# Patient Record
Sex: MALE | Race: ASIAN | NOT HISPANIC OR LATINO | Employment: FULL TIME | URBAN - METROPOLITAN AREA
[De-identification: names, ages, dates, MRNs, and addresses within clinical notes are randomized per-mention and may not be internally consistent; named-entity substitution may affect disease eponyms.]

---

## 2021-12-06 ENCOUNTER — OFFICE VISIT (OUTPATIENT)
Dept: FAMILY MEDICINE | Facility: CLINIC | Age: 26
End: 2021-12-06
Payer: COMMERCIAL

## 2021-12-06 VITALS
HEART RATE: 77 BPM | WEIGHT: 119 LBS | SYSTOLIC BLOOD PRESSURE: 119 MMHG | DIASTOLIC BLOOD PRESSURE: 77 MMHG | OXYGEN SATURATION: 97 % | TEMPERATURE: 98 F | HEIGHT: 66 IN | BODY MASS INDEX: 19.13 KG/M2 | RESPIRATION RATE: 18 BRPM

## 2021-12-06 DIAGNOSIS — Z71.9 ENCOUNTER FOR COUNSELING: ICD-10-CM

## 2021-12-06 DIAGNOSIS — Z13.220 LIPID SCREENING: Primary | ICD-10-CM

## 2021-12-06 DIAGNOSIS — Z13.1 SCREENING FOR DIABETES MELLITUS: ICD-10-CM

## 2021-12-06 PROCEDURE — 0004A COVID-19,PF,PFIZER (12+ YRS): CPT | Performed by: INTERNAL MEDICINE

## 2021-12-06 PROCEDURE — 99203 OFFICE O/P NEW LOW 30 MIN: CPT | Mod: 25 | Performed by: INTERNAL MEDICINE

## 2021-12-06 PROCEDURE — 91300 COVID-19,PF,PFIZER (12+ YRS): CPT | Performed by: INTERNAL MEDICINE

## 2021-12-06 ASSESSMENT — MIFFLIN-ST. JEOR: SCORE: 1462.53

## 2021-12-06 NOTE — PROGRESS NOTES
"    ICD-10-CM    1. Lipid screening  Z13.220 Lipid panel reflex to direct LDL Fasting   2. Screening for diabetes mellitus  Z13.1 Glucose   3. Encounter for counseling  Z71.9      This is extremely nice 26 years old  Patient is here to establish care and we talked about lipid screening and diabetes screening  We will obtain his records from previous immunizations  I also discussed with him about the cancer screenings and vascular screenings at an older age because he is adopted  He has excellent health care hygiene  And it is a pleasure to see    Subjective   Lenin is a 26 year old who presents for the following health issues     HPI     ESTABLISH CARE  Patient is new to me and has moved to [place] from [place]  He is now living independently and is done with grad school in [place]  He has no health issues and is adopted and does not know about his biological parents    History reviewed. No pertinent past medical history.  Family History   Problem Relation Age of Onset     Unknown/Adopted Mother      Unknown/Adopted Father          Review of Systems   10 point ROS of systems including Constitutional, Eyes, Respiratory, Cardiovascular, Gastroenterology, Genitourinary, Integumentary, Muscularskeletal, Psychiatric were all negative except for pertinent positives noted in my HPI.        Objective    /77 (BP Location: Right arm, Patient Position: Chair, Cuff Size: Adult Regular)   Pulse 77   Temp 98  F (36.7  C) (Temporal)   Resp 18   Ht 1.676 m (5' 6\")   Wt 54 kg (119 lb)   SpO2 97%   BMI 19.21 kg/m    Body mass index is 19.21 kg/m .  Physical Exam   GENERAL: healthy, alert and no distress  NECK: no adenopathy, no asymmetry, masses, or scars and thyroid normal to palpation  RESP: lungs clear to auscultation - no rales, rhonchi or wheezes  CV: regular rate and rhythm, normal S1 S2, no S3 or S4, no murmur, click or rub, no peripheral edema and peripheral pulses strong  NEURO: Normal strength and tone, " mentation intact and speech normal  PSYCH: mentation appears normal, affect normal/bright    There is no problem list on file for this patient.

## 2022-02-26 ENCOUNTER — HEALTH MAINTENANCE LETTER (OUTPATIENT)
Age: 27
End: 2022-02-26

## 2022-10-30 ENCOUNTER — HEALTH MAINTENANCE LETTER (OUTPATIENT)
Age: 27
End: 2022-10-30

## 2023-03-23 ENCOUNTER — OFFICE VISIT (OUTPATIENT)
Dept: FAMILY MEDICINE | Facility: CLINIC | Age: 28
End: 2023-03-23
Payer: COMMERCIAL

## 2023-03-23 VITALS
HEART RATE: 73 BPM | WEIGHT: 122 LBS | SYSTOLIC BLOOD PRESSURE: 122 MMHG | RESPIRATION RATE: 16 BRPM | DIASTOLIC BLOOD PRESSURE: 81 MMHG | OXYGEN SATURATION: 96 % | TEMPERATURE: 98 F | HEIGHT: 66 IN | BODY MASS INDEX: 19.61 KG/M2

## 2023-03-23 DIAGNOSIS — L30.9 DERMATITIS: ICD-10-CM

## 2023-03-23 DIAGNOSIS — Z00.00 ROUTINE HISTORY AND PHYSICAL EXAMINATION OF ADULT: Primary | ICD-10-CM

## 2023-03-23 DIAGNOSIS — R21 RASH: ICD-10-CM

## 2023-03-23 PROCEDURE — 99395 PREV VISIT EST AGE 18-39: CPT | Performed by: INTERNAL MEDICINE

## 2023-03-23 RX ORDER — KETOCONAZOLE 20 MG/G
CREAM TOPICAL
COMMUNITY
Start: 2022-10-04

## 2023-03-23 RX ORDER — TRIAMCINOLONE ACETONIDE 1 MG/G
CREAM TOPICAL 2 TIMES DAILY
Qty: 15 G | Refills: 3 | Status: SHIPPED | OUTPATIENT
Start: 2023-03-23 | End: 2023-12-22

## 2023-03-23 RX ORDER — BENZOCAINE/MENTHOL 6 MG-10 MG
LOZENGE MUCOUS MEMBRANE 2 TIMES DAILY
COMMUNITY
Start: 2022-10-04

## 2023-03-23 ASSESSMENT — ENCOUNTER SYMPTOMS
NAUSEA: 0
HEMATOCHEZIA: 0
FEVER: 0
EYE PAIN: 0
PARESTHESIAS: 0
DIARRHEA: 0
WEAKNESS: 0
HEARTBURN: 0
PALPITATIONS: 0
SORE THROAT: 0
HEMATURIA: 0
NERVOUS/ANXIOUS: 0
MYALGIAS: 0
ARTHRALGIAS: 0
DIZZINESS: 0
ABDOMINAL PAIN: 0
SHORTNESS OF BREATH: 0
JOINT SWELLING: 0
FREQUENCY: 0
HEADACHES: 0
CONSTIPATION: 0
CHILLS: 0
COUGH: 0
DYSURIA: 0

## 2023-03-23 ASSESSMENT — PAIN SCALES - GENERAL: PAINLEVEL: NO PAIN (0)

## 2023-03-23 NOTE — PROGRESS NOTES
SUBJECTIVE:   CC: García is an 27 year old who presents for preventative health visit.   Patient has been advised of split billing requirements and indicates understanding: Yes  HPI        Today's PHQ-2 Score:   PHQ-2 ( 1999 [employer]) 12/6/2021   Q1: Little interest or pleasure in doing things 0   Q2: Feeling down, depressed or hopeless 0   PHQ-2 Score 0   Q1: Little interest or pleasure in doing things Not at all   Q2: Feeling down, depressed or hopeless Not at all   PHQ-2 Score 0       Have you ever done Advance Care Planning? (For example, a Health Directive, POLST, or a discussion with a medical provider or your loved ones about your wishes): No, advance care planning information given to patient to review.  Advanced care planning was discussed at today's visit.    Social History     Tobacco Use     Smoking status: Never     Smokeless tobacco: Never   Substance Use Topics     Alcohol use: Not on file     Comment: Occ         Last PSA: No results found for: PSA    Reviewed orders with patient. Reviewed health maintenance and updated orders accordingly - Yes  Labs reviewed in EPIC    Reviewed and updated as needed this visit by clinical staff                  Reviewed and updated as needed this visit by Provider                 No past medical history on file.     Review of Systems  CONSTITUTIONAL: NEGATIVE for fever, chills, change in weight  INTEGUMENTARY/SKIN: NEGATIVE for worrisome rashes, moles or lesions  EYES: NEGATIVE for vision changes or irritation  ENT: NEGATIVE for ear, mouth and throat problems  RESP: NEGATIVE for significant cough or SOB  CV: NEGATIVE for chest pain, palpitations or peripheral edema  GI: NEGATIVE for nausea, abdominal pain, heartburn, or change in bowel habits   male: negative for dysuria, hematuria, decreased urinary stream, erectile dysfunction, urethral discharge  MUSCULOSKELETAL: NEGATIVE for significant arthralgias or myalgia  NEURO: NEGATIVE for weakness, dizziness or  "paresthesias  PSYCHIATRIC: NEGATIVE for changes in mood or affect    OBJECTIVE:   /81 (BP Location: Right arm, Patient Position: Sitting, Cuff Size: Adult Regular)   Pulse 73   Temp 98  F (36.7  C) (Oral)   Resp 16   Ht 1.676 m (5' 6\")   Wt 55.3 kg (122 lb)   SpO2 96%   BMI 19.69 kg/m      Physical Exam  GENERAL: alert and no distress  EYES: Eyes grossly normal to inspection, conjunctivae and sclerae normal  HENT: ear canals and TM's normal, nose and mouth without ulcers or lesions  NECK: no asymmetry  RESP: lungs clear to auscultation  CV: regular rate and rhythm  ABDOMEN: soft, nontender, no hepatosplenomegaly, no masses and bowel sounds normal  MS: no gross musculoskeletal   SKIN: multiple rashes  NEURO: Normal strength and tone, mentation intact and speech normal  PSYCH: mentation appears normal, affect normal/bright    Diagnostic Test Results:  Labs reviewed in Epic    ASSESSMENT/PLAN:   Lenin was seen today for physical and derm problem.    Diagnoses and all orders for this visit:    Routine history and physical examination of adult    Rash  -     REVIEW OF HEALTH MAINTENANCE PROTOCOL ORDERS  -     triamcinolone (KENALOG) 0.1 % external cream; Apply topically 2 times daily  -     Adult Dermatology Referral; Future    Dermatitis  -     REVIEW OF HEALTH MAINTENANCE PROTOCOL ORDERS  -     triamcinolone (KENALOG) 0.1 % external cream; Apply topically 2 times daily  -     Adult Dermatology Referral; Future        Patient has been advised of split billing requirements and indicates understanding: Yes      COUNSELING:   Reviewed preventive health counseling, as reflected in patient instructions  Special attention given to:        Regular exercise       Healthy diet/nutrition        He reports that he has never smoked. He has never used smokeless tobacco.        Sumemr Azevedo MD  [facility]  "

## 2023-12-22 ENCOUNTER — ANCILLARY PROCEDURE (OUTPATIENT)
Dept: GENERAL RADIOLOGY | Facility: CLINIC | Age: 28
End: 2023-12-22
Attending: INTERNAL MEDICINE
Payer: COMMERCIAL

## 2023-12-22 ENCOUNTER — OFFICE VISIT (OUTPATIENT)
Dept: FAMILY MEDICINE | Facility: CLINIC | Age: 28
End: 2023-12-22
Payer: COMMERCIAL

## 2023-12-22 VITALS
RESPIRATION RATE: 16 BRPM | DIASTOLIC BLOOD PRESSURE: 78 MMHG | OXYGEN SATURATION: 94 % | BODY MASS INDEX: 19.44 KG/M2 | SYSTOLIC BLOOD PRESSURE: 110 MMHG | WEIGHT: 121 LBS | HEIGHT: 66 IN | TEMPERATURE: 98.8 F | HEART RATE: 79 BPM

## 2023-12-22 DIAGNOSIS — M25.562 CHRONIC PAIN OF BOTH KNEES: ICD-10-CM

## 2023-12-22 DIAGNOSIS — M25.561 CHRONIC PAIN OF BOTH KNEES: Primary | ICD-10-CM

## 2023-12-22 DIAGNOSIS — G89.29 CHRONIC PAIN OF BOTH KNEES: Primary | ICD-10-CM

## 2023-12-22 DIAGNOSIS — M25.562 CHRONIC PAIN OF BOTH KNEES: Primary | ICD-10-CM

## 2023-12-22 DIAGNOSIS — G89.29 CHRONIC PAIN OF BOTH KNEES: ICD-10-CM

## 2023-12-22 DIAGNOSIS — M25.561 CHRONIC PAIN OF BOTH KNEES: ICD-10-CM

## 2023-12-22 PROCEDURE — 73562 X-RAY EXAM OF KNEE 3: CPT | Mod: TC | Performed by: RADIOLOGY

## 2023-12-22 PROCEDURE — 99213 OFFICE O/P EST LOW 20 MIN: CPT | Performed by: INTERNAL MEDICINE

## 2023-12-22 RX ORDER — TRIAMCINOLONE ACETONIDE 1 MG/G
OINTMENT TOPICAL 2 TIMES DAILY
COMMUNITY
Start: 2023-10-25

## 2023-12-22 ASSESSMENT — PAIN SCALES - GENERAL: PAINLEVEL: MILD PAIN (3)

## 2023-12-22 NOTE — PROGRESS NOTES
Barb Mariano is a 28 year old, presenting for the following health issues:  Musculoskeletal Problem (Knee issue)      History of Present Illness       Reason for visit:  Knee    He eats 2-3 servings of fruits and vegetables daily.He consumes 1 sweetened beverage(s) daily.He exercises with enough effort to increase his heart rate 9 or less minutes per day.  He exercises with enough effort to increase his heart rate 3 or less days per week.   He is taking medications regularly.         Current Medications:     Current Outpatient Medications   Medication Sig Dispense Refill    hydrocortisone (CORTAID) 1 % external cream 2 times daily to affected area      ketoconazole (NIZORAL) 2 % external cream APPLY ONCE DAILY TO AFFECTED AREA X2 WEEKS.      triamcinolone (KENALOG) 0.1 % external ointment Apply topically 2 times daily           Allergies:      Allergies   Allergen Reactions    Cefprozil             Past Medical History:     Past Medical History:   Diagnosis Date    Rash          Past Surgical History:   No past surgical history on file.      Family Medical History:     Family History   Adopted: Yes   Problem Relation Age of Onset    Unknown/Adopted Mother     Unknown/Adopted Father          Social History:     Social History     Socioeconomic History    Marital status: Single     Spouse name: Not on file    Number of children: Not on file    Years of education: Not on file    Highest education level: Not on file   Occupational History    Not on file   Tobacco Use    Smoking status: Never    Smokeless tobacco: Never   Substance and Sexual Activity    Alcohol use: Not on file     Comment: Occ    Drug use: Never    Sexual activity: Yes     Partners: Female     Birth control/protection: Condom   Other Topics Concern    Not on file   Social History Narrative    Not on file     Social Determinants of Health     Financial Resource Strain: Low Risk  (12/22/2023)    Financial Resource Strain     Within the past 12  months, have you or your family members you live with been unable to get utilities (heat, electricity) when it was really needed?: No   Food Insecurity: Low Risk  (12/22/2023)    Food Insecurity     Within the past 12 months, did you worry that your food would run out before you got money to buy more?: No     Within the past 12 months, did the food you bought just not last and you didn t have money to get more?: No   Transportation Needs: Low Risk  (12/22/2023)    Transportation Needs     Within the past 12 months, has lack of transportation kept you from medical appointments, getting your medicines, non-medical meetings or appointments, work, or from getting things that you need?: No   Physical Activity: Not on file   Stress: Not on file   Social Connections: Not on file   Interpersonal Safety: Low Risk  (12/22/2023)    Interpersonal Safety     Do you feel physically and emotionally safe where you currently live?: Yes     Within the past 12 months, have you been hit, slapped, kicked or otherwise physically hurt by someone?: No     Within the past 12 months, have you been humiliated or emotionally abused in other ways by your partner or ex-partner?: No   Housing Stability: Low Risk  (12/22/2023)    Housing Stability     Do you have housing? : Yes     Are you worried about losing your housing?: No           Review of System:     Constitutional: Negative for fever or chills  Skin: Negative for rashes  Ears/Nose/Throat: Negative for nasal congestion, sore throat  Respiratory: No shortness of breath, dyspnea on exertion, cough, or hemoptysis  Cardiovascular: Negative for chest pain  Gastrointestinal: Negative for nausea, vomiting  Genitourinary: Negative for dysuria, hematuria  Musculoskeletal: positive for mechanical k  Neurologic: Negative for headaches  Psychiatric: Negative for depression, anxiety  Hematologic/Lymphatic/Immunologic: Negative  Endocrine: Negative  Behavioral: Negative for tobacco use       Physical  "Exam:   /78 (BP Location: Right arm, Patient Position: Sitting, Cuff Size: Adult Regular)   Pulse 79   Temp 98.8  F (37.1  C) (Oral)   Resp 16   Ht 1.683 m (5' 6.25\")   Wt 54.9 kg (121 lb)   SpO2 94%   BMI 19.38 kg/m      GENERAL: alert and no distress  EYES: eyes grossly normal to inspection, and conjunctivae and sclerae normal  HENT: Normocephalic atraumatic. Nose and mouth without ulcers or lesions  NECK: supple  RESP: lungs clear to auscultation   CV: regular rate and rhythm, normal S1 S2  LYMPH: no peripheral edema   ABDOMEN: nondistended  MS: bilateral anterior knee pain symptoms noted  SKIN: no suspicious lesions or rashes  NEURO: Alert & Oriented x 3.   PSYCH: mentation appears normal, affect normal        Diagnostic Test Results:     Diagnostic Test Results:  Labs reviewed in Epic  Results for orders placed or performed in visit on 12/22/23   XR Knee Bilateral 3 Views     Status: None    Narrative    KNEE BILATERAL 3 VIEWS  DATE/TIME: 12/22/2023 11:49 AM     INDICATION: Bilateral knee pain.   COMPARISON: None.      Impression    IMPRESSION:  1.  Right knee: Normal joint spaces and alignment. No fracture or  joint effusion.  2.  Left knee: Normal joint spaces and alignment. No fracture or joint  effusion.    KENYATTA HARDIN MD         SYSTEM ID:  [id number]         ASSESSMENT/PLAN:       García was seen today for musculoskeletal problem.    Diagnoses and all orders for this visit:    Chronic pain of both knees  -     XR Knee Bilateral 3 Views; Future  -     Orthopedic  Referral; Future            Follow Up Plan:     Patient is instructed to return to Internal Medicine clinic for follow-up visit in 1 month.        Summer Azevedo MD  Internal Medicine  [facility]    "

## 2024-01-31 ENCOUNTER — OFFICE VISIT (OUTPATIENT)
Dept: ORTHOPEDICS | Facility: CLINIC | Age: 29
End: 2024-01-31
Payer: COMMERCIAL

## 2024-01-31 VITALS — WEIGHT: 121 LBS | HEIGHT: 66 IN | BODY MASS INDEX: 19.44 KG/M2

## 2024-01-31 DIAGNOSIS — G89.29 CHRONIC PAIN OF BOTH KNEES: ICD-10-CM

## 2024-01-31 DIAGNOSIS — M67.969 TENDINOPATHY OF PATELLA: ICD-10-CM

## 2024-01-31 DIAGNOSIS — M25.562 CHRONIC PAIN OF BOTH KNEES: ICD-10-CM

## 2024-01-31 DIAGNOSIS — M25.561 CHRONIC PAIN OF BOTH KNEES: ICD-10-CM

## 2024-01-31 DIAGNOSIS — S93.331A SUBLUXATION OF PERONEAL TENDON OF RIGHT FOOT: Primary | ICD-10-CM

## 2024-01-31 PROCEDURE — 99204 OFFICE O/P NEW MOD 45 MIN: CPT | Performed by: FAMILY MEDICINE

## 2024-01-31 NOTE — LETTER
1/31/2024         RE: Lenin Salazar  [address] [place] [address]        Dear Colleague,    Thank you for referring your patient, Lenin Salazar, to the [facility] [place]. Please see a copy of my visit note below.    CHIEF COMPLAINT:  Pain of the Left Knee and Pain of the Right Knee       HISTORY OF PRESENT ILLNESS  Mr. Salazar is a pleasant 28 year old year old male who presents to clinic today with right knee and right ankle pain.  Lenin explains that his pain started about 2 years ago without any specific injury or trauma. Doesn't hurt with walking or running but unable to hike, ski, or do other sports. Hearing and feeling popping and clicking in both the knee and the ankle.    Notes that he has been evaluated in the past.  Focus has been on knee strengthening at that time.  He also recalls being prescribed a patellar tendon strap.  He notes that his pain is not every day necessarily, but it is with higher level activities.  He also notes pain at times at night when his blankets are pressing on his in a certain direction.    Denies any previous history of ankle sprains.  García notes that he is not currently running or involved in any training program.  He does note that he is a hiker.  His last hike exacerbating his symptoms was last fall.    Onset: gradual  Location: right knee and right ankle  Quality:  sore  Duration: 2 years   Severity: 6/10 at worst  Timing:intermittent episodes, worse at night   Modifying factors:  resting and non-use makes it better, movement and use makes it worse  Associated signs & symptoms: pain  Previous similar pain: No  Treatments to date: chiropractor, physical therapy, icing    Additional history: as documented    Review of Systems:  Have you recently had a a fever, chills, weight loss? No  Do you have any vision problems? No  Do you have any chest pain or edema? No  Do you have any shortness of breath or wheezing?  No  Do you  have stomach problems? No  Do you have any numbness or focal weakness? No  Do you have diabetes? No  Do you have problems with bleeding or clotting? No  Do you have an rashes or other skin lesions? No    MEDICAL HISTORY  There is no problem list on file for this patient.      Current Outpatient Medications   Medication Sig Dispense Refill     hydrocortisone (CORTAID) 1 % external cream 2 times daily to affected area       ketoconazole (NIZORAL) 2 % external cream APPLY ONCE DAILY TO AFFECTED AREA X2 WEEKS.       triamcinolone (KENALOG) 0.1 % external ointment Apply topically 2 times daily         Allergies   Allergen Reactions     Cefprozil        Family History   Adopted: Yes   Problem Relation Age of Onset     Unknown/Adopted Mother      Unknown/Adopted Father        Additional medical/Social/Surgical histories reviewed in [facility] and updated as appropriate.       PHYSICAL EXAM  There were no vitals taken for this visit.    General  - normal appearance, in no obvious distress  Musculoskeletal - Right knee  - stance: normal gait without limp  - inspection: no swelling or effusion, normal bone and joint alignment, no obvious deformity  - palpation: no joint line tenderness, patella and patellar tendon non-tender  - ROM: 135 degrees flexion, 0 degrees extension, not painful, normal actively and passively compared to contralateral, audible and palpable click with engagement of the patella into the trochlea during flexion.  - strength: 5/5 in flexion, 5/5 in extension  - special tests:  (-) Lachman  (-) Renato  (-) varus at 0 and 30 degrees flexion  (-) valgus at 0 and 30 degrees flexion  Neuro  - no sensory or motor deficit, grossly normal coordination, normal muscle tone  Musculoskeletal - right ankle  - stance: normal gait without limp, normal single leg squat, no obvious leg length discrepancy, double leg and single-leg squat with mild discomfort anteriorly of the right knee only.  - inspection: no swelling or  effusion,  normal bone and joint alignment, no obvious deformity  - palpation: Tenderness to palpation at superior one third of the patellar tendon.  - ROM: normal dorsiflexion, plantar flexion, inversion, eversion, not painful but there is visible peroneal subluxation with dorsiflexion from a plantarflexed position, tendon subluxes out overlying the posterior aspect of the lateral malleolus and then reduces quickly with plantarflexion.  - strength: 5/5 in all planes  - special tests:  (-) anterior drawer  (-) talar tilt  (-) Tinel's  (-) squeeze test  (-) Busch test  Neuro  - no sensory or motor deficit, grossly normal coordination, normal muscle tone  Skin  - no ecchymosis, erythema, warmth, or induration, no obvious rash  Psych  - interactive, appropriate, normal mood and affect     IMAGING : X-ray bilateral knee 3 view. Final results and radiologist's interpretation, available in the [facility] health record. Images were reviewed with the patient/family members in the office today. My personal interpretation of the performed imaging is no acute osseous abnormality or significant degenerative changes.  No trochlear dysplasia, no patellar tilt.  Limited in office ultrasound performed on the extensor mechanism of right knee.  No effusion of right knee.  Quadriceps tendon intact.  Patellar tendon with typical fibrillar pattern and no evidence for hyperemia with power Doppler index.  No evidence for calcification or significant hypoechoic regions to suggest marked tendinopathy.  Impression: Normal quadriceps and patellar tendon.     ASSESSMENT & PLAN  Mr. Salazar is a 28 year old year old male who presents to clinic today with acute on chronic bilateral anterior knee pain and crepitation with knee flexion, additionally right lateral ankle pain and popping noted with movement.    Diagnosis:  1.  Patellofemoral maltracking bilateral knees  2.  Patellar tendinitis of right knee  3.  Peroneal subluxation of right  ankle    Treatment options initially discussed for his knee pain.  There is a focused region of patellar tendon pain.  Ultrasound today does not reveal any calcifications or loss of typical fibrillar pattern of patellar tendon to suggest tearing.  I recommended he pursue a physical therapy program to work on both patellar tendinopathy as well as patellar tracking.  If this continues to his persist despite formal therapy as he has not had PT in the past, I would then recommend an MRI.  We discussed consideration for possible patellar tendon debridement procedure such as Tenex or PRP.  May try Voltaren gel to the anterior knee, trial for at least twice daily for 1 month.    Secondarily we discussed ankle secondary to peroneal tendon subluxation.  No prior history of trauma to suggest posttraumatic nature.  Suspected ligamentous laxity and he has minimal pain with subluxation today.  Would like him to continue with ankle strengthening program and use a Tri-Lock ankle brace which was dispensed for hiking and high-level activities that may exacerbate tendon.  May also try Voltaren gel to the ankle with exacerbation.  Tri-Lock was offered today for dispensing but he would like to think about this further before obtaining.    It was a pleasure seeing Lenin today.    40 minutes on date of the encounter doing chart review, history and examination, independent imaging review, documentation, and additional activities noted above.    Bernard Fisher DO, [facility]  Primary Care Sports Medicine      Again, thank you for allowing me to participate in the care of your patient.        Sincerely,        Bernard Fisher DO

## 2024-01-31 NOTE — PATIENT INSTRUCTIONS
Thank you for choosing [facility]    [facility]  [place] [address] [address] [facility], [address]th [place]   [place], [place], [address] [place] [address]   [phone]       APPOINTMENTS: [phone]    CARE QUESTIONS: [phone],    BILLING QUESTIONS: [phone]    FAX NUMBER: [phone]        Follow up: As needed if not improving      1. Subluxation of peroneal tendon of right foot    2. Chronic pain of both knees    3. Tendinopathy of patella      Physical Therapy orders have been placed with [facility] (formally [facility])  You can call [phone] to schedule at your convenience.     Peroneal Tendon Subluxation  -PT referral  -Tri-tamiko ankle brace for uneven terrain/hiking    Patellofemoral pain/patellar tendinitis  -Formal PT  -Voltaren gel      -Consider tendon treatments such as Tenex or PRP if no improvement

## 2024-01-31 NOTE — PROGRESS NOTES
CHIEF COMPLAINT:  Pain of the Left Knee and Pain of the Right Knee       HISTORY OF PRESENT ILLNESS  Mr. Salazar is a pleasant 28 year old year old male who presents to clinic today with right knee and right ankle pain.  Lenin explains that his pain started about 2 years ago without any specific injury or trauma. Doesn't hurt with walking or running but unable to hike, ski, or do other sports. Hearing and feeling popping and clicking in both the knee and the ankle.    Notes that he has been evaluated in the past.  Focus has been on knee strengthening at that time.  He also recalls being prescribed a patellar tendon strap.  He notes that his pain is not every day necessarily, but it is with higher level activities.  He also notes pain at times at night when his blankets are pressing on his in a certain direction.    Denies any previous history of ankle sprains.  García notes that he is not currently running or involved in any training program.  He does note that he is a hiker.  His last hike exacerbating his symptoms was last fall.    Onset: gradual  Location: right knee and right ankle  Quality:  sore  Duration: 2 years   Severity: 6/10 at worst  Timing:intermittent episodes, worse at night   Modifying factors:  resting and non-use makes it better, movement and use makes it worse  Associated signs & symptoms: pain  Previous similar pain: No  Treatments to date: chiropractor, physical therapy, icing    Additional history: as documented    Review of Systems:  Have you recently had a a fever, chills, weight loss? No  Do you have any vision problems? No  Do you have any chest pain or edema? No  Do you have any shortness of breath or wheezing?  No  Do you have stomach problems? No  Do you have any numbness or focal weakness? No  Do you have diabetes? No  Do you have problems with bleeding or clotting? No  Do you have an rashes or other skin lesions? No    MEDICAL HISTORY  There is no problem list on file for this  patient.      Current Outpatient Medications   Medication Sig Dispense Refill    hydrocortisone (CORTAID) 1 % external cream 2 times daily to affected area      ketoconazole (NIZORAL) 2 % external cream APPLY ONCE DAILY TO AFFECTED AREA X2 WEEKS.      triamcinolone (KENALOG) 0.1 % external ointment Apply topically 2 times daily         Allergies   Allergen Reactions    Cefprozil        Family History   Adopted: Yes   Problem Relation Age of Onset    Unknown/Adopted Mother     Unknown/Adopted Father        Additional medical/Social/Surgical histories reviewed in [facility] and updated as appropriate.       PHYSICAL EXAM  There were no vitals taken for this visit.    General  - normal appearance, in no obvious distress  Musculoskeletal - Right knee  - stance: normal gait without limp  - inspection: no swelling or effusion, normal bone and joint alignment, no obvious deformity  - palpation: no joint line tenderness, patella and patellar tendon non-tender  - ROM: 135 degrees flexion, 0 degrees extension, not painful, normal actively and passively compared to contralateral, audible and palpable click with engagement of the patella into the trochlea during flexion.  - strength: 5/5 in flexion, 5/5 in extension  - special tests:  (-) Lachman  (-) Renato  (-) varus at 0 and 30 degrees flexion  (-) valgus at 0 and 30 degrees flexion  Neuro  - no sensory or motor deficit, grossly normal coordination, normal muscle tone  Musculoskeletal - right ankle  - stance: normal gait without limp, normal single leg squat, no obvious leg length discrepancy, double leg and single-leg squat with mild discomfort anteriorly of the right knee only.  - inspection: no swelling or effusion,  normal bone and joint alignment, no obvious deformity  - palpation: Tenderness to palpation at superior one third of the patellar tendon.  - ROM: normal dorsiflexion, plantar flexion, inversion, eversion, not painful but there is visible peroneal subluxation with  dorsiflexion from a plantarflexed position, tendon subluxes out overlying the posterior aspect of the lateral malleolus and then reduces quickly with plantarflexion.  - strength: 5/5 in all planes  - special tests:  (-) anterior drawer  (-) talar tilt  (-) Tinel's  (-) squeeze test  (-) Busch test  Neuro  - no sensory or motor deficit, grossly normal coordination, normal muscle tone  Skin  - no ecchymosis, erythema, warmth, or induration, no obvious rash  Psych  - interactive, appropriate, normal mood and affect     IMAGING : X-ray bilateral knee 3 view. Final results and radiologist's interpretation, available in the [facility] health record. Images were reviewed with the patient/family members in the office today. My personal interpretation of the performed imaging is no acute osseous abnormality or significant degenerative changes.  No trochlear dysplasia, no patellar tilt.  Limited in office ultrasound performed on the extensor mechanism of right knee.  No effusion of right knee.  Quadriceps tendon intact.  Patellar tendon with typical fibrillar pattern and no evidence for hyperemia with power Doppler index.  No evidence for calcification or significant hypoechoic regions to suggest marked tendinopathy.  Impression: Normal quadriceps and patellar tendon.     ASSESSMENT & PLAN  Mr. Salazar is a 28 year old year old male who presents to clinic today with acute on chronic bilateral anterior knee pain and crepitation with knee flexion, additionally right lateral ankle pain and popping noted with movement.    Diagnosis:  1.  Patellofemoral maltracking bilateral knees  2.  Patellar tendinitis of right knee  3.  Peroneal subluxation of right ankle    Treatment options initially discussed for his knee pain.  There is a focused region of patellar tendon pain.  Ultrasound today does not reveal any calcifications or loss of typical fibrillar pattern of patellar tendon to suggest tearing.  I recommended he pursue a physical  therapy program to work on both patellar tendinopathy as well as patellar tracking.  If this continues to his persist despite formal therapy as he has not had PT in the past, I would then recommend an MRI.  We discussed consideration for possible patellar tendon debridement procedure such as Tenex or PRP.  May try Voltaren gel to the anterior knee, trial for at least twice daily for 1 month.    Secondarily we discussed ankle secondary to peroneal tendon subluxation.  No prior history of trauma to suggest posttraumatic nature.  Suspected ligamentous laxity and he has minimal pain with subluxation today.  Would like him to continue with ankle strengthening program and use a Tri-Lock ankle brace which was dispensed for hiking and high-level activities that may exacerbate tendon.  May also try Voltaren gel to the ankle with exacerbation.  Tri-Lock was offered today for dispensing but he would like to think about this further before obtaining.    It was a pleasure seeing Lenin today.    40 minutes on date of the encounter doing chart review, history and examination, independent imaging review, documentation, and additional activities noted above.    Bernard Fisher DO, CAQSM  Primary Care Sports Medicine

## 2024-02-06 ENCOUNTER — THERAPY VISIT (OUTPATIENT)
Dept: PHYSICAL THERAPY | Facility: CLINIC | Age: 29
End: 2024-02-06
Attending: FAMILY MEDICINE
Payer: COMMERCIAL

## 2024-02-06 DIAGNOSIS — S93.331A SUBLUXATION OF PERONEAL TENDON OF RIGHT FOOT: ICD-10-CM

## 2024-02-06 DIAGNOSIS — M67.969 TENDINOPATHY OF PATELLA: ICD-10-CM

## 2024-02-06 PROCEDURE — 97161 PT EVAL LOW COMPLEX 20 MIN: CPT | Mod: GP

## 2024-02-06 PROCEDURE — 97110 THERAPEUTIC EXERCISES: CPT | Mod: GP

## 2024-02-06 NOTE — PROGRESS NOTES
"PHYSICAL THERAPY EVALUATION  Type of Visit: Evaluation    See electronic medical record for Abuse and Falls Screening details.    Subjective   Pt notes he has been struggling with knee problems over the past few years. He notes it has been progressively getting worse. He is progressively having more difficulties with walking, running, skiing, and playing volleyball. He describes a \"numbing\" and pressure sensation in his knee. At rest symptoms are 1/10 pain in the front of his knee, but typically gets 4/10 pain when trying to sleep. He notes hiking last year caused the worst pain.       Presenting condition or subjective complaint:    Date of onset:      Relevant medical history:     Past Medical History:   Diagnosis Date    Rash      Dates & types of surgery:  No past surgical history on file.    Prior diagnostic imaging/testing results:     XR knee B:   IMPRESSION:  1.  Right knee: Normal joint spaces and alignment. No fracture or  joint effusion.  2.  Left knee: Normal joint spaces and alignment. No fracture or joint  effusion.    Prior therapy history for the same diagnosis, illness or injury:     short session in 2021    Employment:      tech consultant,   Hobbies/Interests:        Patient goals for therapy:   Playing volleyball, running, and hiking without pain       Objective   KNEE EVALUATION  POSTURE:   GAIT:  Mildly noted R lateral shift with R pes planus. R foot slight ER throughout cycle.  ROM: AROM WNL  AROM WFL  PROM WNL  PROM WFL  STRENGTH:  Mild impairments to R LE resisted hip flexion, knee extension, knee flexion all when compared to L LE.  FLEXIBILITY:  Impairments to hamstring, gastrocnemius, and soleus muscle lengths  SPECIAL TESTS:  Ligament stress tests WFL - mild pain with apprehension testing to patella  FUNCTIONAL TESTS: Double Leg Squat: Anterior knee translation, Knee valgus, Hip internal rotation, Improper use of glutes/hips, and With heels elevated knees pop  SLS: Increased use of hip " strategy from expectation on B LE  PALPATION:  Tenderness noted with greater intensity to R distal rectus femoris, R VMO, R adductor muscle mass, R medial hamstrings, and pes anserine all compared to L LE.   JOINT MOBILITY:  Patella mobility WFL with mild pain    Assessment & Plan   CLINICAL IMPRESSIONS  Medical Diagnosis: Patella tendinopathy    Treatment Diagnosis: R>L knee pain with strength and motor control   Impression/Assessment: Patient is a 28 year old male with R>L knee pain complaints.  The following significant findings have been identified: Pain, Decreased ROM/flexibility, Decreased joint mobility, Decreased strength, Impaired balance, Decreased proprioception, Impaired gait, Impaired muscle performance, and Decreased activity tolerance. These impairments interfere with their ability to perform recreational activities as compared to previous level of function.     Clinical Decision Making (Complexity):  Clinical Presentation: Stable/Uncomplicated  Clinical Presentation Rationale: based on medical and personal factors listed in PT evaluation  Clinical Decision Making (Complexity): Low complexity    PLAN OF CARE  Treatment Interventions:  Interventions: Gait Training, Manual Therapy, Neuromuscular Re-education, Therapeutic Activity, Therapeutic Exercise, Self-Care/Home Management    Long Term Goals     PT Goal 1  Goal Identifier: Running  Goal Description: Pt will return to runninng on alternate terrains with minimal to no increase in B knee pain.  Rationale: to maximize safety and independence with performance of ADLs and functional tasks  Target Date: 04/16/24      Frequency of Treatment: 1x/week  Duration of Treatment: 8 weeks    Education Assessment:   Learner/Method: Patient;Demonstration;Pictures/Video;No Barriers to Learning    Risks and benefits of evaluation/treatment have been explained.   Patient/Family/caregiver agrees with Plan of Care.     Evaluation Time:     PT Felipa, Low Complexity Minutes  (87927): (P) 15     Signing Clinician: LIZ FAUST

## 2024-06-09 ENCOUNTER — HEALTH MAINTENANCE LETTER (OUTPATIENT)
Age: 29
End: 2024-06-09

## 2025-06-15 ENCOUNTER — HEALTH MAINTENANCE LETTER (OUTPATIENT)
Age: 30
End: 2025-06-15